# Patient Record
(demographics unavailable — no encounter records)

---

## 2024-12-06 NOTE — HISTORY OF PRESENT ILLNESS
[FreeTextEntry1] : [facility] no [facility] no old records, unsure what meds he takes except for insulin

## 2024-12-06 NOTE — ASSESSMENT
[FreeTextEntry1] : totally inadequate visit as nursing home sent no information, add tradjenta and pioglitazone, taper insulin PRN. ADD OZEMPIC 0.25 WWEKLY TO 0.5 MG. WWEKLY, STOP INSULIN LISPRO.